# Patient Record
(demographics unavailable — no encounter records)

---

## 2025-01-28 NOTE — DISCUSSION/SUMMARY
[EKG obtained to assist in diagnosis and management of assessed problem(s)] : EKG obtained to assist in diagnosis and management of assessed problem(s) [FreeTextEntry1] : Inc Bumex to 2mg BID Inc milrinone to 0.2 mcg/kg/min Can do labs Q2-3 weeks  Had positive depression screening today 1/28/25., will refer to PCP for evaluation.  Patient and partner agreed.  RTO in 3 weeks

## 2025-01-28 NOTE — PHYSICAL EXAM
[Normal Conjunctiva] : normal conjunctiva [Normal S1, S2] : normal S1, S2 [Murmur] : murmur [Clear Lung Fields] : clear lung fields [No Respiratory Distress] : no respiratory distress  [Soft] : abdomen soft [Non Tender] : non-tender [Moves all extremities] : moves all extremities [Alert and Oriented] : alert and oriented [No Acute Distress] : no acute distress [de-identified] : thin, frail [de-identified] : (+) V waves [de-identified] : mild distention [de-identified] : RUE PIC [de-identified] : 1+ edema

## 2025-01-28 NOTE — CARDIOLOGY SUMMARY
[de-identified] : 1/28/25 Sinus Rhythm, 81bpm, BiV Paced  [de-identified] : 6/13/24 TTE: LVEF 17%, LVIDd 7, WVS 0.9, LVPW 0.8, nl RV size with normal systolic function, TAPSE 1.5, severe RA dilation, LA mildly dilated, AS, AI, MV annuloplasty ring without stenosis, mod-severe MR, mod TR, PASP 48  2012 TTE: LVEF 12%, LVIDd 7.1, walls 0.7, LA 4.2, RVE with RVSD, mod-severe TR, sev MR, mild-mod LA [de-identified] : primary prevention AICD 2013 CRT-D upgrade 8/21/24 [de-identified] : 2012 LHC/RHC: LHC normal cors, BP 89/63/75, RA 11, PA 41/23/31, PCWP 24, LVedp 25, PA 48.5%, CO/CI 2.6/1.34, PVR 2.69, SVR 1970 [de-identified] : 10/2012 MV repair

## 2025-01-28 NOTE — REVIEW OF SYSTEMS
[FreeTextEntry2] : see hpi [FreeTextEntry1] : 14 point ROS done and found to be negative or noncontributory other than noted in HPI.

## 2025-01-28 NOTE — HISTORY OF PRESENT ILLNESS
[FreeTextEntry1] : Mr. Dacosta is a 65 y/o M, ABO A, with a long standing, dilated NICM, HFrEF (LVEF 17%, LVIDd 7, at least since 2012), severe Mr s/p MV annuloplasty 10/2012, mod sev TR., LBBB, AICD s/p CRT-D upgrade 8/2024, DM2 (A1C 8/2% 6/2024), cervical radiculopathy, low risk prostate CA (dx 1/2024, under surveillance, follows with Dr. Marisa Chambers), CKD (b/l Cr 1.6 since 2/2024), who presents today for f/u after long hospitalization at [facility], where he was deemed not a candidate for advanced HF therapies.  He was d/c home on milrinone 0.167.   He's had a long history of HFrEF, at least since 2012, He was hospitalized at [facility] in [place] for ADHF in 2019.    Visit today: Had positive depression screening 1/28/25. Very fatigued and with occasional SOB.  He is sedentary and unable to perform daily activities.  He is unable to shower due to weakness and fatigue.  He is not sleeping well despite Remeron.  He feels the same as when he left the hospital. He did not want to go to rehab. He is cared for by is wife/partner and her son.  Has RN visits for milrinone. Appetite is fair.  Has irregular BM.  Last time was 1/27/25.  Has orthopnea; sits up for about 10 minutes until breathing is better. This is the same since hospital discharge. Weight at home 156lbs and in office today is 156lbs (70kg)  BP at home not checked.  In office today is 97/64.  Denies an LH or dizziness.  Denies CP. Has LE swelling and takes extra Bumex with inc urination with minimal relief of LE edema.

## 2025-03-18 NOTE — PHYSICAL EXAM
[Ill-Appearing] : ill-appearing [No Carotid Bruit] : no carotid bruit [Normal S1, S2] : normal S1, S2 [No Murmur] : no murmur [Clear Lung Fields] : clear lung fields [No Respiratory Distress] : no respiratory distress  [Moves all extremities] : moves all extremities [Alert and Oriented] : alert and oriented [de-identified] : frail, in wheelchair [de-identified] : (+) V waves [de-identified] : mild distention, mild tenderness near umbilicus [de-identified] : RUE PIC [de-identified] : 3+ edema

## 2025-03-18 NOTE — DISCUSSION/SUMMARY
[FreeTextEntry1] : Will admit to [facility]. Start Bumex gtt at 1 mg/hr. Check labs. CXR, repeat echo. Increase milrinone to 0.3.  [EKG obtained to assist in diagnosis and management of assessed problem(s)] : EKG obtained to assist in diagnosis and management of assessed problem(s)

## 2025-03-18 NOTE — CARDIOLOGY SUMMARY
[de-identified] : 1/28/25 Sinus Rhythm, 81bpm, BiV Paced  [de-identified] : 6/13/24 TTE: LVEF 17%, LVIDd 7, WVS 0.9, LVPW 0.8, nl RV size with normal systolic function, TAPSE 1.5, severe RA dilation, LA mildly dilated, AS, AI, MV annuloplasty ring without stenosis, mod-severe MR, mod TR, PASP 48  2012 TTE: LVEF 12%, LVIDd 7.1, walls 0.7, LA 4.2, RVE with RVSD, mod-severe TR, sev MR, mild-mod MD [de-identified] : primary prevention AICD 2013 CRT-D upgrade 8/21/24 [de-identified] : 2012 LHC/RHC: LHC normal cors, BP 89/63/75, RA 11, PA 41/23/31, PCWP 24, LVedp 25, PA 48.5%, CO/CI 2.6/1.34, PVR 2.69, SVR 1970 [de-identified] : 10/2012 MV repair

## 2025-04-21 NOTE — ASSESSMENT
[FreeTextEntry1] : Stage D HF, on home milrinone. ADHFrEF. Euvolemic and hypotensive.  Na was low on recent labs.  Will monitor with lab draw in office today.

## 2025-04-21 NOTE — CARDIOLOGY SUMMARY
[de-identified] : 1/28/25 Sinus Rhythm, 81bpm, BiV Paced  [de-identified] : 6/13/24 TTE: LVEF 17%, LVIDd 7, WVS 0.9, LVPW 0.8, nl RV size with normal systolic function, TAPSE 1.5, severe RA dilation, LA mildly dilated, AS, AI, MV annuloplasty ring without stenosis, mod-severe MR, mod TR, PASP 48  2012 TTE: LVEF 12%, LVIDd 7.1, walls 0.7, LA 4.2, RVE with RVSD, mod-severe TR, sev MR, mild-mod VA [de-identified] : primary prevention AICD 2013 CRT-D upgrade 8/21/24 [de-identified] : 2012 LHC/RHC: LHC normal cors, BP 89/63/75, RA 11, PA 41/23/31, PCWP 24, LVedp 25, PA 48.5%, CO/CI 2.6/1.34, PVR 2.69, SVR 1970 [de-identified] : 10/2012 MV repair

## 2025-04-21 NOTE — PHYSICAL EXAM
[Ill-Appearing] : ill-appearing [No Carotid Bruit] : no carotid bruit [Normal S1, S2] : normal S1, S2 [No Murmur] : no murmur [Clear Lung Fields] : clear lung fields [No Respiratory Distress] : no respiratory distress  [Moves all extremities] : moves all extremities [Alert and Oriented] : alert and oriented [Soft] : abdomen soft [Non Tender] : non-tender [No Edema] : no edema [de-identified] : frail, in wheelchair [de-identified] : (+) V waves [de-identified] : not distended [de-identified] : RUE PIC

## 2025-04-21 NOTE — HISTORY OF PRESENT ILLNESS
[FreeTextEntry1] : Mr. Dacosta is a 65 y/o M, ABO A, with a long standing, dilated NICM, HFrEF Stage D (LVEF 17%, LVIDd 7, at least since 2012), severe MR s/p MV annuloplasty 10/2012, mod sev TR., LBBB, ICD s/p CRT-D upgrade 8/2024, DM2 (A1C 8/2% 6/2024), cervical radiculopathy, low risk prostate CA (dx 1/2024, under surveillance, follows with Dr. Marisa Chambers), CKD (b/l Cr 1.6 since 2/2024), who comes for f/u. He recently was hospitalized at [facility], where he was deemed not a candidate for advanced HF therapies.  Presents for follow-up with his friend.   He is on milrinone. He's had a long history of HFrEF, at least since 2012.  Patient presents today for follow-up. Hospitalized 3/18-3/26/25. He had with complaints of worsening SOB at rest, was found to be in ADHF and was sent to ED from the office for admission into CCU. Pt has undergone evaluation for advanced heart failure therapies but unfortunately deemed not a candidate based on neurocognitive status c/w vascular dementia. Labs showed lactate 1.8, proBNP 5828, BUN/Cr 39/1.64, TSH 4.61, trop 132/161, K 3.4. CXR shows mild PVC. Overall stage D HF, NYHA class IV- admitted with severe hypervolemia, in cardiogenic shock, improved with increase in milrinone gtt and Bumex gtt.    Pt is not very clear on his medications.  Updated list from PCP does not reflect post discharge list.   Today he reports rare feelings of SOB at night. On Home NC O2 PRN. Has occasional PND. Gets SOB with a little exertion walking around the house. No LE Edema.  Appetite is fair and BM every 2-3 days but better with laxative.

## 2025-04-21 NOTE — DISCUSSION/SUMMARY
[FreeTextEntry1] : C/w current meds  pt and friend unsure of meds, updated meds today, gave list to patient and friend and asked them to check at home for meds he is taking and call if there are discrepancies.   RTO in 1 month  [EKG obtained to assist in diagnosis and management of assessed problem(s)] : EKG obtained to assist in diagnosis and management of assessed problem(s)

## 2025-05-27 NOTE — DISCUSSION/SUMMARY
[EKG obtained to assist in diagnosis and management of assessed problem(s)] : EKG obtained to assist in diagnosis and management of assessed problem(s) [FreeTextEntry1] : C/w current meds but decrease Hydralazine and Isosorbide from TID to BID for hypotension.  Take Bumex 6mg tonight and tomorrow morning.   He is c/o twitching but no sustained seizure-like activity.  Monitor closely at home.  If continues or gets worse, her should go to ER. Will monitor electrolytes.  Scheduled to see PCP in a few days.  Home labs scheduled to be drawn tomorrow.   RTO in 1 month

## 2025-05-27 NOTE — HISTORY OF PRESENT ILLNESS
[FreeTextEntry1] : Mr. Dacosta is a 66 y/o M, ABO A, with a long standing, dilated NICM, HFrEF Stage D (LVEF 17%, LVIDd 7, at least since 2012), severe MR s/p MV annuloplasty 10/2012, mod sev TR., LBBB, ICD s/p CRT-D upgrade 8/2024, DM2 (A1C 8/2% 6/2024), cervical radiculopathy, low risk prostate CA (dx 1/2024, under surveillance, follows with Dr. Marisa Chambers), CKD (b/l Cr 1.6 since 2/2024), who comes for f/u. He recently was hospitalized at [facility], where he was deemed not a candidate for advanced HF therapies.  Presents for follow-up with his friend.   He is on milrinone. He's had a long history of HFrEF, at least since 2012.  He was previously hospitalized 3/18-3/26/25. He had with complaints of worsening SOB at rest, was found to be in ADHF and was sent to ED from the office for admission into CCU. Pt has undergone evaluation for advanced heart failure therapies but unfortunately deemed not a candidate based on neurocognitive status c/w vascular dementia. Labs showed lactate 1.8, proBNP 5828, BUN/Cr 39/1.64, TSH 4.61, trop 132/161, K 3.4. CXR shows mild PVC. Overall stage D HF, NYHA class IV- admitted with severe hypervolemia, in cardiogenic shock, improved with increase in milrinone gtt and Bumex gtt.   He was hospitalized 4/24-4/30/24 for his Milrinone pack/PICC line malfunction.  PICC line is in place for Milrinone drip.    Patient presents today 5/27/25 for follow-up. He was hospitalized 5/7-5/16/25 for Hyperglycemia.  The Milrinone drip was in D5 and the rate was decreased.  He was started on Insulin therapy.   He is c/o twitching but no sustained seizure-like activity.    Today he reports continued rare feelings of SOB at night. On Home NC O2 PRN. Has occasional PND. Feels weak.  Gets SOB with a little exertion walking around the house. R>L LE Edema.  Appetite is fair and BM every 2-3 days but better with laxative.   Has not take needed extra Bumex.   Weight 152lbs today, was 140lbs on 5/7/25 office visit.  Cannot find a discharge weight.  BP at home mid 80's -100's, 81/49, denies LH.

## 2025-05-27 NOTE — CARDIOLOGY SUMMARY
[de-identified] : 5/27/25 Sinus Rhythm, 72bpm, Occ PVC's, BiV Paced  5/7/25 Sinus Rhythm, 85bpm, BIV paced 1/28/25 Sinus Rhythm, 81bpm, BiV Paced  [de-identified] : 6/13/24 TTE: LVEF 17%, LVIDd 7, WVS 0.9, LVPW 0.8, nl RV size with normal systolic function, TAPSE 1.5, severe RA dilation, LA mildly dilated, AS, AI, MV annuloplasty ring without stenosis, mod-severe MR, mod TR, PASP 48  2012 TTE: LVEF 12%, LVIDd 7.1, walls 0.7, LA 4.2, RVE with RVSD, mod-severe TR, sev MR, mild-mod [place] [de-identified] : primary prevention AICD 2013 CRT-D upgrade 8/21/24 [de-identified] : 2012 LHC/RHC: LHC normal cors, BP 89/63/75, RA 11, PA 41/23/31, PCWP 24, LVedp 25, PA 48.5%, CO/CI 2.6/1.34, PVR 2.69, SVR 1970 [de-identified] : 10/2012 MV repair

## 2025-05-27 NOTE — PHYSICAL EXAM
[No Acute Distress] : no acute distress [Ill-Appearing] : ill-appearing [No Carotid Bruit] : no carotid bruit [Normal S1, S2] : normal S1, S2 [Clear Lung Fields] : clear lung fields [No Respiratory Distress] : no respiratory distress  [Soft] : abdomen soft [Moves all extremities] : moves all extremities [Alert and Oriented] : alert and oriented [Murmur] : murmur [de-identified] : frail, in wheelchair [de-identified] : mild JVD sitting up, unable to get to exam table to lay down  [de-identified] : systolic ll/Vl [de-identified] : not distended [de-identified] : RUE PIC [de-identified] : Mild R>L LE edema

## 2025-06-23 NOTE — HISTORY OF PRESENT ILLNESS
[FreeTextEntry1] :  yo  with PMH of here for Type 2 Diabetes.   HISTORY OF PRESENT ILLNESS 67y Male with a past medical history of HTN, HLD, AFib/Flutter (on Eliquis) s/p DCCV (2024), LBBB s/p CRT-D upgrade (2024), HFrEF (EF 15% 3/2025) on home Milrinone 0.5mcg/kg/min, severe TR) on home Milrinone, severe MR s/p MV repair (2012), T2DM ,CKD (baseline SCr ~1.6), prostate CA, CVA (2024) presenting from outpatient cardiology appt for hyperglycemia. He was found to be hyperglycemic with blood glucose 406 and elevated SCr 2.37. Denies associated nausea/emesis. Presently denies CP, palpitations, SOB, fever, chills, n/v/d. Of note, patient was recently admitted from 4/24 - 4/30 in the setting of milrinone pump failure. Since discharge, patient states that he has been compliant with his medications and has been voiding without issue at home. During last stay, patient's milrinone gtt nina was increased from 0.25 to 0.5 mcg/kg/hr and since then, patient has been going through a bag of medication every 2-3 days versus every 7 days prior. Patient on home regimen of farxiga, januvia, glipizide, taking as directed. No recent changes in diet. Labs not indicative of DKA. Home milrinone gtt dose increased 1 week ago, suspended in D5 has been going through bags of medication twice as fast. A1c in March of 7.8%. 10.4% this admission. Of note, pt discharged from [facility] on 4/30 with BS in 300 range as well, and at this time milrinone gtt dose was increased. Endocrinology has been consulted for Diabetes Management with associated hyperglycemia.  DIABETES HISTORY - Age at diagnosis: 10+ years ago - Diabetes managed outpatient by: PCP Dr. Kirby - Current Therapy: Farxiga 10 mg, Glipizide 10 mg, Januvia 100 mg - History of other regimens: Pt has never required insulin, has been well managed on oral medications. - Home glucose readings: unknown- does not check - History of DKA/HHS: denies - Diabetic Complications: CKD - Diet: endorses DASH diet with mostly sugar free alternatives. denies any recent changes in diet over last 1-2 years. States he usually has Diabetic rice with his meals and mostly eats lean proteins and vegetables at meals.  ASSESSMENT / RECOMMENDATIONS: 67M with PMH of HTN, HLD, AFib/Flutter (on Eliquis) s/p DCCV (2024), LBBB s/p CRT-D upgrade (2024), HFrEF (EF 15% 3/2025) on home Milrinone 0.5mcg/kg/min, severe TR) on home Milrinone, severe MR s/p MV repair (2012), T2DM ,CKD (baseline SCr ~1.6), prostate CA, CVA (2024) presenting from outpatient cardiology appt for hyperglycemia. Likely 2/2 to increased milrinone dose requirements which is suspended in D5. Endocrinology is consulted for DM with Hyperglycemia. BUN: 68 Creatinine: 2.14 GFR: 33 Weight: 63.5 BMI: 21.3 EF:  # Type 2 diabetes mellitus with hyperglycemia - Pt to receive 12 U Lantus today in early evening, with continuation of Lantus 12 u at bedtime tomorrow (may consider adjusting based on 3 am FS BG) - Started on 4 U Lispro pre-meals - Continue lispro sliding scale before meals and at bedtime. - Patient's fingerstick glucose goal is 100-180 mg/dL. - Discharge recommendations to be discussed. Pt would benefit from modification of milrinone gtt fluids from d5 or with increased concentration of medication, however unavailable at this time per pharmacy. Given continuous, increasing need for milrinone likely causing persistent hyperglycemia and 2 pt increase in Hgba1c over 2 months, pt will require insulin supplementation as long as he is on milrinone gtt. Final recommendations re: dosing pending inpatient monitoring and response. Given new insulin requirement for basal/bolus regimen, will likely discontinue Glipizide and Januvia on discharge. Pt is amenable to insulin teaching at this time given rapid decline in his blood glucose control, however states he is not interested in a CGM at this time.   #HTN - Goal BP <130/80 for DM however with EF requiring milinorone - Management per primary team given HF - c/w home medications - UACR as outpatient  #HLD - Goal LDL <70 - c/w statin  - Assessment ASSESSMENT / RECOMMENDATIONS: 67M with PMH of HTN, HLD, AFib/Flutter (on Eliquis) s/p DCCV (2024), LBBB s/p CRT-D upgrade (2024), HFrEF (EF 15% 3/2025) on home Milrinone 0.5mcg/kg/min, severe TR) on home Milrinone, severe MR s/p MV repair (2012), T2DM ,CKD (baseline SCr ~1.6), prostate CA, CVA (2024) presenting from outpatient cardiology appt for hyperglycemia.  #Poorly controlled T2DM with hyperglycemia - HbA1c: 10.4 % - Home Regimen: Farxiga 10mg, Glipizide 10mg, Januvia 100mg - Endocrinologist: none- PCP Dr. Kirby - eGFR: 36 mL/min/1.73m2 (05-09-25 @ 05:45) INPATIENT PLAN: - Inpatient BG goal 100-180mg/dl: FSs at goal. Serum glucose glucose 58 this am. - Decrease Lantus to 20 units at bedtime. DO NOT HOLD IF NPO. - Continue Admelog 6 units TID pre-meal. HOLD IF NPO. - Continue low dose Admelog correction scale pre-meal - Continue low dose Admelog correction scale at bedtime - Fingerstick BG before meals and bedtime, Q 6hrs if NPO - Carbohydrate consistent diet - RD consult  DISCHARGE PLAN: - Lantus 16 units at bedtime. - Admelog 5 units SQ before meals. - Resume Farxiga 10mg - Stop Glipizide - Stop Januvia - Send Rx for Marina 3 CGM  plus sensors  #HTN - Goal BP <130/80 for DM however with EF requiring milinorone - Management per primary team given HF - c/w home medications - UACR as outpatient  #HLD - Goal LDL <70 - c/w statin - Outpatient goal BP <130/80. - Can check urine albumin/creatinine outpatient - Management per primary team.   CURRENT HPI: Diagnosis: >10 years ago Previous Endo: none, was managed by PCP in [place] Adherence: A1c: Pertinent labs:  Current DM Medications: -Semglee 18 units qhs (adherent) -Admelog 6 units qac (adherent, injects 15 minutes before the meals)  -Farxiga 10 mg daily  Past DM medications: -glipizide 10 mg daily  -Januvia 100 mg daily   Complications: +Hospitalized 5/2025 for hyperglycemia i/s/o milrinone infusions. Complications: +HFrEF on milrenone. +Afib/aflutter s/p DCCV (2024) on Eliquis. LBBB s/p CRT-D upgrade (2024). +Severe MR s/p MVR (2012). +CVA (2024).  Eyes: Last ophthalmology visit 6/2025. Unknown if any h/o of diabetic retinopathy. Denies blurry vision. +Glaucoma. Dr. Susana Kraft [phone]. Feet: Has not seen podiatry before. +Neuropathy on gabapentin. Denies h/o foot ulcers or sores. Nephrology: eGFR 22 (6/17/2025). Prot/cr ratio 0.1 (5/8/2025). Denies h/o nephropathy. Denies h/o UTIs or genital mycotic infections. Denies polyuria or polydipsia. Weight Change: stable ~146 lbs.   SMBG vs. CGM: checks  x/day fasting- pre-lunch-  pre-dinner- Hypoglycemia? Any hypoglycemic symptoms?   Current Diet: breakfast (9am)- cereal with milk, waffle lunch (2pm-3pm)- tuna sandwich on ww bread, chicken, egg salad, once in a while pizza or hot dog dinner (6pm-7pm)- everything, 2 fistfuls of bread, rice, pasta, chicken/beef/fish, veggies, beans snacks- cake, pie, a lot of fruit, sugar free cookies, sugar free fruit cups rinsed drinks- diet soda sometimes, diluted low sugar juice, water Exercise:     PMH:  Surgical Hx: Social Hx: former smoker (quit 6 years ago), stopped alcohol use FHx: mom (DM), denies personal or FHx of thyroid cancer or pancreatitis Current Meds: Supplements: Occupation:   Do you have back-up long-acting insulin? Glucagon? Ketone strips? Medical alert? Pen needles for it? H/o DKA?

## 2025-06-23 NOTE — HISTORY OF PRESENT ILLNESS
[FreeTextEntry1] :  yo  with PMH of here for Type 2 Diabetes.   HISTORY OF PRESENT ILLNESS 67y Male with a past medical history of HTN, HLD, AFib/Flutter (on Eliquis) s/p DCCV (2024), LBBB s/p CRT-D upgrade (2024), HFrEF (EF 15% 3/2025) on home Milrinone 0.5mcg/kg/min, severe TR) on home Milrinone, severe MR s/p MV repair (2012), T2DM ,CKD (baseline SCr ~1.6), prostate CA, CVA (2024) presenting from outpatient cardiology appt for hyperglycemia. He was found to be hyperglycemic with blood glucose 406 and elevated SCr 2.37. Denies associated nausea/emesis. Presently denies CP, palpitations, SOB, fever, chills, n/v/d. Of note, patient was recently admitted from 4/24 - 4/30 in the setting of milrinone pump failure. Since discharge, patient states that he has been compliant with his medications and has been voiding without issue at home. During last stay, patient's milrinone gtt nina was increased from 0.25 to 0.5 mcg/kg/hr and since then, patient has been going through a bag of medication every 2-3 days versus every 7 days prior. Patient on home regimen of farxiga, januvia, glipizide, taking as directed. No recent changes in diet. Labs not indicative of DKA. Home milrinone gtt dose increased 1 week ago, suspended in D5 has been going through bags of medication twice as fast. A1c in March of 7.8%. 10.4% this admission. Of note, pt discharged from [facility] on 4/30 with BS in 300 range as well, and at this time milrinone gtt dose was increased. Endocrinology has been consulted for Diabetes Management with associated hyperglycemia.  DIABETES HISTORY - Age at diagnosis: 10+ years ago - Diabetes managed outpatient by: PCP Dr. Kirby - Current Therapy: Farxiga 10 mg, Glipizide 10 mg, Januvia 100 mg - History of other regimens: Pt has never required insulin, has been well managed on oral medications. - Home glucose readings: unknown- does not check - History of DKA/HHS: denies - Diabetic Complications: CKD - Diet: endorses DASH diet with mostly sugar free alternatives. denies any recent changes in diet over last 1-2 years. States he usually has Diabetic rice with his meals and mostly eats lean proteins and vegetables at meals.  ASSESSMENT / RECOMMENDATIONS: 67M with PMH of HTN, HLD, AFib/Flutter (on Eliquis) s/p DCCV (2024), LBBB s/p CRT-D upgrade (2024), HFrEF (EF 15% 3/2025) on home Milrinone 0.5mcg/kg/min, severe TR) on home Milrinone, severe MR s/p MV repair (2012), T2DM ,CKD (baseline SCr ~1.6), prostate CA, CVA (2024) presenting from outpatient cardiology appt for hyperglycemia. Likely 2/2 to increased milrinone dose requirements which is suspended in D5. Endocrinology is consulted for DM with Hyperglycemia. BUN: 68 Creatinine: 2.14 GFR: 33 Weight: 63.5 BMI: 21.3 EF:  # Type 2 diabetes mellitus with hyperglycemia - Pt to receive 12 U Lantus today in early evening, with continuation of Lantus 12 u at bedtime tomorrow (may consider adjusting based on 3 am FS BG) - Started on 4 U Lispro pre-meals - Continue lispro sliding scale before meals and at bedtime. - Patient's fingerstick glucose goal is 100-180 mg/dL. - Discharge recommendations to be discussed. Pt would benefit from modification of milrinone gtt fluids from d5 or with increased concentration of medication, however unavailable at this time per pharmacy. Given continuous, increasing need for milrinone likely causing persistent hyperglycemia and 2 pt increase in Hgba1c over 2 months, pt will require insulin supplementation as long as he is on milrinone gtt. Final recommendations re: dosing pending inpatient monitoring and response. Given new insulin requirement for basal/bolus regimen, will likely discontinue Glipizide and Januvia on discharge. Pt is amenable to insulin teaching at this time given rapid decline in his blood glucose control, however states he is not interested in a CGM at this time.   #HTN - Goal BP <130/80 for DM however with EF requiring milinorone - Management per primary team given HF - c/w home medications - UACR as outpatient  #HLD - Goal LDL <70 - c/w statin  - Assessment ASSESSMENT / RECOMMENDATIONS: 67M with PMH of HTN, HLD, AFib/Flutter (on Eliquis) s/p DCCV (2024), LBBB s/p CRT-D upgrade (2024), HFrEF (EF 15% 3/2025) on home Milrinone 0.5mcg/kg/min, severe TR) on home Milrinone, severe MR s/p MV repair (2012), T2DM ,CKD (baseline SCr ~1.6), prostate CA, CVA (2024) presenting from outpatient cardiology appt for hyperglycemia.  #Poorly controlled T2DM with hyperglycemia - HbA1c: 10.4 % - Home Regimen: Farxiga 10mg, Glipizide 10mg, Januvia 100mg - Endocrinologist: none- PCP Dr. Kirby - eGFR: 36 mL/min/1.73m2 (05-09-25 @ 05:45) INPATIENT PLAN: - Inpatient BG goal 100-180mg/dl: FSs at goal. Serum glucose glucose 58 this am. - Decrease Lantus to 20 units at bedtime. DO NOT HOLD IF NPO. - Continue Admelog 6 units TID pre-meal. HOLD IF NPO. - Continue low dose Admelog correction scale pre-meal - Continue low dose Admelog correction scale at bedtime - Fingerstick BG before meals and bedtime, Q 6hrs if NPO - Carbohydrate consistent diet - RD consult  DISCHARGE PLAN: - Lantus 16 units at bedtime. - Admelog 5 units SQ before meals. - Resume Farxiga 10mg - Stop Glipizide - Stop Januvia - Send Rx for Marina 3 CGM  plus sensors  #HTN - Goal BP <130/80 for DM however with EF requiring milinorone - Management per primary team given HF - c/w home medications - UACR as outpatient  #HLD - Goal LDL <70 - c/w statin - Outpatient goal BP <130/80. - Can check urine albumin/creatinine outpatient - Management per primary team.   CURRENT HPI: Diagnosis: >10 years ago Previous Endo: none, was managed by PCP in [place] Adherence: A1c: Pertinent labs:  Current DM Medications: -Semglee 18 units qhs (adherent) -Admelog 6 units qac (adherent, injects 15 minutes before the meals)  -Farxiga 10 mg daily  Past DM medications: -glipizide 10 mg daily  -Januvia 100 mg daily   Complications: +Hospitalized 5/2025 for hyperglycemia i/s/o milrinone infusions. Complications: +HFrEF on milrenone. +Afib/aflutter s/p DCCV (2024) on Eliquis. LBBB s/p CRT-D upgrade (2024). +Severe MR s/p MVR (2012). +CVA (2024).  Eyes: Last ophthalmology visit 6/2025. Unknown if any h/o of diabetic retinopathy. Denies blurry vision. +Glaucoma. Dr. Susana Kraft [phone]. Feet: Has not seen podiatry before. +Neuropathy on gabapentin. Denies h/o foot ulcers or sores. Nephrology: eGFR 22 (6/17/2025). Prot/cr ratio 0.1 (5/8/2025). Denies h/o nephropathy. Denies h/o UTIs or genital mycotic infections. Denies polyuria or polydipsia. Weight Change: stable ~146 lbs.   SMBG vs. CGM: checks  x/day fasting- pre-lunch-  pre-dinner- Hypoglycemia? Any hypoglycemic symptoms?   Current Diet: breakfast (9am)- cereal with milk, waffle lunch (2pm-3pm)- tuna sandwich on ww bread, chicken, egg salad, once in a while pizza or hot dog dinner (6pm-7pm)- everything, 2 fistfuls of bread, rice, pasta, chicken/beef/fish, veggies, beans snacks- cake, pie, a lot of fruit, sugar free cookies, sugar free fruit cups rinsed drinks- diet soda sometimes, diluted low sugar juice, water Exercise:     PMH:  Surgical Hx: Social Hx: former smoker (quit 6 years ago), stopped alcohol use FHx: mom (DM), denies personal or FHx of thyroid cancer or pancreatitis Current Meds: Supplements: Occupation:   Do you have back-up long-acting insulin? Glucagon? Ketone strips? Medical alert? Pen needles for it? H/o DKA?

## 2025-06-23 NOTE — ASSESSMENT
[FreeTextEntry1] : plan: dec Semglee to 16 units qac  adjust AL 6/6/7 units- take 15 mins before meals. Adjust bkft meal cont Farxiga 10 mg  see pic for meds [General Appearance - Well Developed] : well developed [Normal Appearance] : normal appearance [Well Groomed] : well groomed [General Appearance - Well Nourished] : well nourished [No Deformities] : no deformities [General Appearance - In No Acute Distress] : no acute distress [Clean] : clean [Left Infraclavicular] : left infraclavicular area [Dry] : dry [Well-Healed] : well-healed [Palpable Crepitus] : no palpable crepitus [Bleeding] : no active bleeding [Foul Odor] : no foul smell [Purulent Drainage] : no purulent drainage [Serosanguineous Drainage] : no serosanquineous drainage [Serous Drainage] : no serous drainage [Erythema] : not erythematous [Warm] : not warm [Tender] : not tender [Indurated] : not indurated [Fluctuant] : not fluctuant

## 2025-06-23 NOTE — HISTORY OF PRESENT ILLNESS
[FreeTextEntry1] :  yo  with PMH of here for Type 2 Diabetes.   HISTORY OF PRESENT ILLNESS 67y Male with a past medical history of HTN, HLD, AFib/Flutter (on Eliquis) s/p DCCV (2024), LBBB s/p CRT-D upgrade (2024), HFrEF (EF 15% 3/2025) on home Milrinone 0.5mcg/kg/min, severe TR) on home Milrinone, severe MR s/p MV repair (2012), T2DM ,CKD (baseline SCr ~1.6), prostate CA, CVA (2024) presenting from outpatient cardiology appt for hyperglycemia. He was found to be hyperglycemic with blood glucose 406 and elevated SCr 2.37. Denies associated nausea/emesis. Presently denies CP, palpitations, SOB, fever, chills, n/v/d. Of note, patient was recently admitted from 4/24 - 4/30 in the setting of milrinone pump failure. Since discharge, patient states that he has been compliant with his medications and has been voiding without issue at home. During last stay, patient's milrinone gtt nina was increased from 0.25 to 0.5 mcg/kg/hr and since then, patient has been going through a bag of medication every 2-3 days versus every 7 days prior. Patient on home regimen of farxiga, januvia, glipizide, taking as directed. No recent changes in diet. Labs not indicative of DKA. Home milrinone gtt dose increased 1 week ago, suspended in D5 has been going through bags of medication twice as fast. A1c in March of 7.8%. 10.4% this admission. Of note, pt discharged from [facility] on 4/30 with BS in 300 range as well, and at this time milrinone gtt dose was increased. Endocrinology has been consulted for Diabetes Management with associated hyperglycemia.  DIABETES HISTORY - Age at diagnosis: 10+ years ago - Diabetes managed outpatient by: PCP Dr. Kirby - Current Therapy: Farxiga 10 mg, Glipizide 10 mg, Januvia 100 mg - History of other regimens: Pt has never required insulin, has been well managed on oral medications. - Home glucose readings: unknown- does not check - History of DKA/HHS: denies - Diabetic Complications: CKD - Diet: endorses DASH diet with mostly sugar free alternatives. denies any recent changes in diet over last 1-2 years. States he usually has Diabetic rice with his meals and mostly eats lean proteins and vegetables at meals.  ASSESSMENT / RECOMMENDATIONS: 67M with PMH of HTN, HLD, AFib/Flutter (on Eliquis) s/p DCCV (2024), LBBB s/p CRT-D upgrade (2024), HFrEF (EF 15% 3/2025) on home Milrinone 0.5mcg/kg/min, severe TR) on home Milrinone, severe MR s/p MV repair (2012), T2DM ,CKD (baseline SCr ~1.6), prostate CA, CVA (2024) presenting from outpatient cardiology appt for hyperglycemia. Likely 2/2 to increased milrinone dose requirements which is suspended in D5. Endocrinology is consulted for DM with Hyperglycemia. BUN: 68 Creatinine: 2.14 GFR: 33 Weight: 63.5 BMI: 21.3 EF:  # Type 2 diabetes mellitus with hyperglycemia - Pt to receive 12 U Lantus today in early evening, with continuation of Lantus 12 u at bedtime tomorrow (may consider adjusting based on 3 am FS BG) - Started on 4 U Lispro pre-meals - Continue lispro sliding scale before meals and at bedtime. - Patient's fingerstick glucose goal is 100-180 mg/dL. - Discharge recommendations to be discussed. Pt would benefit from modification of milrinone gtt fluids from d5 or with increased concentration of medication, however unavailable at this time per pharmacy. Given continuous, increasing need for milrinone likely causing persistent hyperglycemia and 2 pt increase in Hgba1c over 2 months, pt will require insulin supplementation as long as he is on milrinone gtt. Final recommendations re: dosing pending inpatient monitoring and response. Given new insulin requirement for basal/bolus regimen, will likely discontinue Glipizide and Januvia on discharge. Pt is amenable to insulin teaching at this time given rapid decline in his blood glucose control, however states he is not interested in a CGM at this time.   #HTN - Goal BP <130/80 for DM however with EF requiring milinorone - Management per primary team given HF - c/w home medications - UACR as outpatient  #HLD - Goal LDL <70 - c/w statin  - Assessment ASSESSMENT / RECOMMENDATIONS: 67M with PMH of HTN, HLD, AFib/Flutter (on Eliquis) s/p DCCV (2024), LBBB s/p CRT-D upgrade (2024), HFrEF (EF 15% 3/2025) on home Milrinone 0.5mcg/kg/min, severe TR) on home Milrinone, severe MR s/p MV repair (2012), T2DM ,CKD (baseline SCr ~1.6), prostate CA, CVA (2024) presenting from outpatient cardiology appt for hyperglycemia.  #Poorly controlled T2DM with hyperglycemia - HbA1c: 10.4 % - Home Regimen: Farxiga 10mg, Glipizide 10mg, Januvia 100mg - Endocrinologist: none- PCP Dr. Kirby - eGFR: 36 mL/min/1.73m2 (05-09-25 @ 05:45) INPATIENT PLAN: - Inpatient BG goal 100-180mg/dl: FSs at goal. Serum glucose glucose 58 this am. - Decrease Lantus to 20 units at bedtime. DO NOT HOLD IF NPO. - Continue Admelog 6 units TID pre-meal. HOLD IF NPO. - Continue low dose Admelog correction scale pre-meal - Continue low dose Admelog correction scale at bedtime - Fingerstick BG before meals and bedtime, Q 6hrs if NPO - Carbohydrate consistent diet - RD consult  DISCHARGE PLAN: - Lantus 16 units at bedtime. - Admelog 5 units SQ before meals. - Resume Farxiga 10mg - Stop Glipizide - Stop Januvia - Send Rx for Marina 3 CGM  plus sensors  #HTN - Goal BP <130/80 for DM however with EF requiring milinorone - Management per primary team given HF - c/w home medications - UACR as outpatient  #HLD - Goal LDL <70 - c/w statin - Outpatient goal BP <130/80. - Can check urine albumin/creatinine outpatient - Management per primary team.   CURRENT HPI: Diagnosis: >10 years ago Previous Endo: none, was managed by PCP in [place] Adherence: A1c: Pertinent labs:  Current DM Medications: -Semglee 18 units qhs (adherent) -Admelog 6 units qac (adherent, injects 15 minutes before the meals)  -Farxiga 10 mg daily  Past DM medications: -glipizide 10 mg daily  -Januvia 100 mg daily   Complications: +Hospitalized 5/2025 for hyperglycemia i/s/o milrinone infusions. Complications: +HFrEF on milrenone. +Afib/aflutter s/p DCCV (2024) on Eliquis. LBBB s/p CRT-D upgrade (2024). +Severe MR s/p MVR (2012). +CVA (2024).  Eyes: Last ophthalmology visit 6/2025. Unknown if any h/o of diabetic retinopathy. Denies blurry vision. +Glaucoma. Dr. Susana Kraft [phone]. Feet: Has not seen podiatry before. +Neuropathy on gabapentin. Denies h/o foot ulcers or sores. Nephrology: eGFR 22 (6/17/2025). Prot/cr ratio 0.1 (5/8/2025). Denies h/o nephropathy. Denies h/o UTIs or genital mycotic infections. Denies polyuria or polydipsia. Weight Change: stable ~146 lbs.   SMBG vs. CGM: checks  x/day fasting- pre-lunch-  pre-dinner- Hypoglycemia? Any hypoglycemic symptoms?   Current Diet: breakfast (9am)- cereal with milk, waffle lunch (2pm-3pm)- tuna sandwich on ww bread, chicken, egg salad, once in a while pizza or hot dog dinner (6pm-7pm)- everything, 2 fistfuls of bread, rice, pasta, chicken/beef/fish, veggies, beans snacks- cake, pie, a lot of fruit, sugar free cookies, sugar free fruit cups rinsed drinks- diet soda sometimes, diluted low sugar juice, water Exercise:     PMH:  Surgical Hx: Social Hx: former smoker (quit 6 years ago), stopped alcohol use FHx: mom (DM), denies personal or FHx of thyroid cancer or pancreatitis Current Meds: Supplements: Occupation:   Do you have back-up long-acting insulin? Glucagon? Ketone strips? Medical alert? Pen needles for it? H/o DKA?

## 2025-06-23 NOTE — ASSESSMENT
[FreeTextEntry1] : plan: dec Semglee to 16 units qac  adjust AL 6/6/7 units- take 15 mins before meals. Adjust bkft meal cont Farxiga 10 mg  see pic for meds

## 2025-06-26 NOTE — DISCUSSION/SUMMARY
[EKG obtained to assist in diagnosis and management of assessed problem(s)] : EKG obtained to assist in diagnosis and management of assessed problem(s) [FreeTextEntry1] : c/w current meds  scheduled to see Nephrology   RTO in 1 month to see Dr. Huff

## 2025-06-26 NOTE — HISTORY OF PRESENT ILLNESS
[FreeTextEntry1] : Mr. Dacosta is a 68 y/o M, ABO A, with a long standing, dilated NICM, HFrEF Stage D (LVEF 17%, LVIDd 7, at least since 2012), severe MR s/p MV annuloplasty 10/2012, mod sev TR., LBBB, ICD s/p CRT-D upgrade 8/2024, DM2 (A1C 8/2% 6/2024), cervical radiculopathy, low risk prostate CA (dx 1/2024, under surveillance, follows with Dr. Marisa Chambers), CKD (b/l Cr 1.6 since 2/2024), who comes for f/u. He recently was hospitalized at [facility], where he was deemed not a candidate for advanced HF therapies.  Presents for follow-up with his friend.   He is on milrinone. He's had a long history of HFrEF, at least since 2012.  He was previously hospitalized 3/18-3/26/25. He had with complaints of worsening SOB at rest, was found to be in ADHF and was sent to ED from the office for admission into CCU. Pt has undergone evaluation for advanced heart failure therapies but unfortunately deemed not a candidate based on neurocognitive status c/w vascular dementia. Labs showed lactate 1.8, proBNP 5828, BUN/Cr 39/1.64, TSH 4.61, trop 132/161, K 3.4. CXR shows mild PVC. Overall stage D HF, NYHA class IV- admitted with severe hypervolemia, in cardiogenic shock, improved with increase in milrinone gtt and Bumex gtt.   He was hospitalized 4/24-4/30/24 for his Milrinone pack/PICC line malfunction.  PICC line is in place for Milrinone drip.    He was hospitalized 5/7-5/16/25 for Hyperglycemia.  The Milrinone drip was in D5 and the rate was decreased.  He was started on Insulin therapy.   Patient presents today 6/26/25 for follow-up. Denies hospitalizations or ER visits since last office visit.  At last office visit he c/o twitching but no sustained seizure-like activity. Has resolved on it's own.    Today he reports continued rare feelings of SOB at night but it has been less. On Home NC O2 PRN. Has occasional PND. Feels weak but has more energy lately.  Gets SOB with a little exertion walking around the house. No LE Edema.  Appetite is fair and BM every 2-3 days but better with laxative. He has been eating better. Weight stable in office at 152-153lbs.   Has not needed extra Bumex.  Creatinine is higher, he scheduled to see Nephrologist.

## 2025-06-26 NOTE — PHYSICAL EXAM
[No Acute Distress] : no acute distress [Ill-Appearing] : ill-appearing [No Carotid Bruit] : no carotid bruit [Normal S1, S2] : normal S1, S2 [Murmur] : murmur [Clear Lung Fields] : clear lung fields [No Respiratory Distress] : no respiratory distress  [Soft] : abdomen soft [Moves all extremities] : moves all extremities [Alert and Oriented] : alert and oriented [Normal Venous Pressure] : normal venous pressure [de-identified] : frail, in wheelchair [de-identified] :  unable to get to exam table to lay down  [de-identified] : systolic l/Vl, l/Vl Diastolic at LSB  [de-identified] : not distended [de-identified] : RUE PIC [de-identified] : Mild R>L LE edema

## 2025-06-26 NOTE — CARDIOLOGY SUMMARY
[de-identified] : 6/26/25 Sinus Rhythm, Occ PVC's, BiV Paced  5/27/25 Sinus Rhythm, 72bpm, Occ PVC's, BiV Paced  5/7/25 Sinus Rhythm, 85bpm, BIV paced 1/28/25 Sinus Rhythm, 81bpm, BiV Paced  [de-identified] : 6/13/24 TTE: LVEF 17%, LVIDd 7, WVS 0.9, LVPW 0.8, nl RV size with normal systolic function, TAPSE 1.5, severe RA dilation, LA mildly dilated, AS, AI, MV annuloplasty ring without stenosis, mod-severe MR, mod TR, PASP 48  2012 TTE: LVEF 12%, LVIDd 7.1, walls 0.7, LA 4.2, RVE with RVSD, mod-severe TR, sev MR, mild-mod IL [de-identified] : primary prevention AICD 2013 CRT-D upgrade 8/21/24 [de-identified] : 2012 LHC/RHC: LHC normal cors, BP 89/63/75, RA 11, PA 41/23/31, PCWP 24, LVedp 25, PA 48.5%, CO/CI 2.6/1.34, PVR 2.69, SVR 1970 [de-identified] : 10/2012 MV repair

## 2025-07-08 NOTE — ASSESSMENT
[FreeTextEntry1] : A case of NAT on CKD in the setting of cardiomyopathy with low EF (17%) and low BP, and diabetes mellitus. The patient had serum creatinine of 2,38 on 5/7/2025 and went up to 2.85 mg/dl on 6.24/25. The patient is being evaluated for NAT.   Lab tests carried out on 7/7/25 showed a mild improvement in serum creatinine to 2.68 mg/dl and an increase in GFR from 23 to 25 ml/min. Urine analysis was within an acceptable range. The cause of NAT has been low blood pressure, causing a decreased perfusion of the kidneys. Serum creatinine level fluctuates depending on perfusion.

## 2025-07-08 NOTE — REASON FOR VISIT
[Home] : at home, [unfilled] , at the time of the visit. [Medical Office: ([facility])___] : at the medical office located in  [Telephone (audio)] : This telephonic visit was provided via audio only technology. [Verbal consent obtained from patient] : the patient, [unfilled] [Initial Evaluation] : an initial evaluation [FreeTextEntry1] : NAT on CKD

## 2025-07-08 NOTE — HISTORY OF PRESENT ILLNESS
[FreeTextEntry1] : A case of NAT on CKD in the setting of cardiomyopathy with low EF (17%) and low BP, and diabetes mellitus. The patient had serum creatinine of 2,3 8 on 5/7/2025 and went up to 2.85 mg/dl on 6.24/25. The patient is being evaluated for NAT.

## 2025-07-23 NOTE — ASSESSMENT
[FreeTextEntry1] : Impression: 1. Hematochezia - resolved - ddx includes diverticular bleeding, angioectasia, colorectal cancers 2. Anemia - improved since May admission, likely multifactorial from prior blood loss, with significant component of anemia of chronic disease/chronic kidney disease  Plan: - Patient at this time is not bleeding, as evidenced by brown stools, no clinical bleeding, and hemoglobin that has recovered. - Discussed that he is at elevated high risk for endoscopic evaluation and colonoscopy.  In addition to anesthesia risks, also explained the inherent risks of bleeding, infection, perforation of colonoscopy - Given patient's severely depressed EF and cardiac comorbidities, patient agrees to hold off on additional testing including stool testing for colon cancer screening at this time, as he is unlikely to be able to tolerate colonoscopy and does not want to pursue colonoscopy even if a Cologuard test was positive.  At this time, endoscopic evaluation risks outweighs the benefits - Asked patient to consume a high-fiber diet, given diverticulosis seen on CT imaging - If his EF and cardiac status improves or there are recurrent signs of bleeding, we will reconsider colonoscopy - If hematochezia recurs, asked patient to go right away back to the emergency room for evaluation and for possible transfusions if needed  50% of time spent reviewing hospital records

## 2025-07-23 NOTE — PHYSICAL EXAM
[Alert] : alert [Normal Voice/Communication] : normal voice/communication [Healthy Appearing] : healthy appearing [No Acute Distress] : no acute distress [Sclera] : the sclera and conjunctiva were normal [Hearing Threshold Finger Rub Not [place]] : hearing was normal [Normal Appearance] : the appearance of the neck was normal [No Respiratory Distress] : no respiratory distress [No Acc Muscle Use] : no accessory muscle use [Respiration, Rhythm And Depth] : normal respiratory rhythm and effort [Auscultation Breath Sounds / Voice Sounds] : lungs were clear to auscultation bilaterally [Normal S1, S2] : normal S1 and S2 [Bowel Sounds] : normal bowel sounds [Abdomen Tenderness] : non-tender [No Masses] : no abdominal mass palpated [Abdomen Soft] : soft [No CVA Tenderness] : no CVA  tenderness [Abnormal Walk] : normal gait [Normal Color / Pigmentation] : normal skin color and pigmentation [No Focal Deficits] : no focal deficits [Oriented To Time, Place, And Person] : oriented to person, place, and time [de-identified] : Irregularly irregular

## 2025-07-23 NOTE — HISTORY OF PRESENT ILLNESS
[FreeTextEntry1] : This is a 67-year-old male with a history of cardiomyopathy, EF of 15%, severe tricuspid regurgitation with moderate pulmonary hypertension, A-flutter on Eliquis, status post AICD, hypertension, CKD, diabetes, prostate cancer, CVA in 2024, who presents for follow-up for hematochezia.  The patient was seen by the inpatient GI team earlier this year in 5/2025 for hematochezia that self resolved.  He required 1 unit of PRBC transfusion, for hemoglobin down to 6.8.  He has not had any recurrence of bleeding since.  There was no abdominal pain associate with the bleeding.  Imaging did not show any active extravasation or colonic thickening or inflammation.  He has never had a colonoscopy before.  There is no known colon or gastric cancer in his family.  Currently, his bowel movements are daily without any changes, and are brown.  He denies any abdominal pain.  No trouble eating or swallowing, no reflux or dysphagia.  7/7/25: Na 135, K 4.2, Cl 96, bicarb 20, glucose 169, Ca 10.2, BUN 78, Cr 2.68, P4 WBC 4.96, Hgb 10.9, Hct 34.5,  5/9/25; 27% saturation, iron 75, TIBC 207, ferritin 223  TTE 3/19/25  1. The left ventricular cavity is moderately dilated. Left ventricular wall thickness is normal. Left ventricular systolic function is severely decreased with a calculated ejection fraction of 15 % by the [place]'s biplane method of disks. There is global left ventricular hypokinesis. There is increased LV mass and eccentric hypertrophy. There is no evidence of a left ventricular thrombus. Analysis of left ventricular diastolic function and filling pressure is made challenging by the presence of mitral annuloplasty ring.  2. The right ventricular cavity is enlarged in size and right ventricular systolic function is reduced. Tricuspid annular plane systolic excursion (TAPSE) is 1.4 cm (normal >=1.7 cm). A device lead is visualized in the right heart.  3. An annuloplasty ring is noted in the mitral position. Moderate intravalvular mitral regurgitation. The transmitral peak gradient is 13.2 mmHg and mean gradient is 4.50 mmHg.  4. The aortic valve anatomy cannot be determined. There is calcification of the aortic valve leaflets. There is mild aortic regurgitation.  5. The left atrium is severely dilated with an indexed volume of 60.41 ml/m.  6. Structurally normal tricuspid valve with normal leaflet excursion. There is moderate to severe tricuspid regurgitation. Estimated pulmonary artery systolic pressure is 53 mmHg, consistent with moderate pulmonary hypertension.  7. The inferior vena cava is dilated measuring 3.30 cm in diameter, (dilated >2.1cm) with abnormal inspiratory collapse (abnormal <50%) consistent with elevated right atrial pressure ( R 15, range 10-20mmHg).  8. Compared to the transthoracic echocardiogram performed on 6/13/2024, there have been no significant interval changes.  [de-identified] : 5/9/25 FINDINGS: LOWER CHEST: Cardiomegaly. Partially visualized cardiac device leads.  Multiple new nodules with surrounding groundglass halo, measuring up to 5  mm.  LIVER: Within normal limits. BILE DUCTS: Normal caliber. GALLBLADDER: Layering sludge. SPLEEN: Within normal limits. PANCREAS: Within normal limits. ADRENALS: Within normal limits. KIDNEYS/URETERS: 1.2 cm cyst in the upper pole of the right kidney.  BLADDER: Decompressed. REPRODUCTIVE ORGANS: Prostate within normal limits.  BOWEL: No bowel obstruction. Appendix is normal. Moderate stool. Colonic  diverticulosis. PERITONEUM/RETROPERITONEUM: Stable mild stranding in the retroperitoneum  around the pancreas. No collection. VESSELS: Atherosclerotic changes. LYMPH NODES: No lymphadenopathy. ABDOMINAL WALL: Within normal limits. BONES: Right femoral neck fixation. Degenerative changes most prominent  at L4-L5.  IMPRESSION: *  No retroperitoneal hemorrhage. *  No acute pathology within the abdomen or pelvis. *  A few scattered new nodules in the lung bases are felt to be  infectious in etiology. Short-term follow-up CT chest is advised to  assess for resolution.